# Patient Record
Sex: FEMALE | Race: WHITE | Employment: UNEMPLOYED | URBAN - METROPOLITAN AREA
[De-identification: names, ages, dates, MRNs, and addresses within clinical notes are randomized per-mention and may not be internally consistent; named-entity substitution may affect disease eponyms.]

---

## 2021-06-01 ENCOUNTER — APPOINTMENT (OUTPATIENT)
Dept: GENERAL RADIOLOGY | Facility: HOSPITAL | Age: 56
End: 2021-06-01
Attending: EMERGENCY MEDICINE
Payer: COMMERCIAL

## 2021-06-01 ENCOUNTER — HOSPITAL ENCOUNTER (EMERGENCY)
Facility: HOSPITAL | Age: 56
Discharge: HOME | End: 2021-06-02
Attending: EMERGENCY MEDICINE
Payer: COMMERCIAL

## 2021-06-01 DIAGNOSIS — R10.9 ABDOMINAL PAIN OF UNKNOWN ETIOLOGY: ICD-10-CM

## 2021-06-01 DIAGNOSIS — M54.2 NECK PAIN: ICD-10-CM

## 2021-06-01 DIAGNOSIS — R00.2 PALPITATIONS: ICD-10-CM

## 2021-06-01 DIAGNOSIS — R53.83 FATIGUE, UNSPECIFIED TYPE: Primary | ICD-10-CM

## 2021-06-01 PROCEDURE — 85025 COMPLETE CBC W/AUTO DIFF WBC: CPT | Performed by: EMERGENCY MEDICINE

## 2021-06-01 PROCEDURE — 99284 EMERGENCY DEPT VISIT MOD MDM: CPT

## 2021-06-01 PROCEDURE — 81001 URINALYSIS AUTO W/SCOPE: CPT | Performed by: EMERGENCY MEDICINE

## 2021-06-01 PROCEDURE — 93005 ELECTROCARDIOGRAM TRACING: CPT

## 2021-06-01 PROCEDURE — 80076 HEPATIC FUNCTION PANEL: CPT | Performed by: EMERGENCY MEDICINE

## 2021-06-01 PROCEDURE — 87880 STREP A ASSAY W/OPTIC: CPT

## 2021-06-01 PROCEDURE — 80048 BASIC METABOLIC PNL TOTAL CA: CPT | Performed by: EMERGENCY MEDICINE

## 2021-06-01 PROCEDURE — 71045 X-RAY EXAM CHEST 1 VIEW: CPT | Performed by: EMERGENCY MEDICINE

## 2021-06-01 PROCEDURE — 36415 COLL VENOUS BLD VENIPUNCTURE: CPT

## 2021-06-01 PROCEDURE — 84484 ASSAY OF TROPONIN QUANT: CPT | Performed by: EMERGENCY MEDICINE

## 2021-06-01 PROCEDURE — 83690 ASSAY OF LIPASE: CPT | Performed by: EMERGENCY MEDICINE

## 2021-06-01 PROCEDURE — 93010 ELECTROCARDIOGRAM REPORT: CPT | Performed by: EMERGENCY MEDICINE

## 2021-06-01 RX ORDER — METFORMIN HYDROCHLORIDE 500 MG/1
1000 TABLET, EXTENDED RELEASE ORAL
COMMUNITY
Start: 2020-03-17

## 2021-06-01 RX ORDER — LEVOTHYROXINE SODIUM 0.12 MG/1
125 TABLET ORAL DAILY
COMMUNITY
Start: 2021-02-05

## 2021-06-01 RX ORDER — LISINOPRIL 40 MG/1
TABLET ORAL
COMMUNITY
Start: 2021-01-28

## 2021-06-01 RX ORDER — ERGOCALCIFEROL (VITAMIN D2) 1250 MCG
CAPSULE ORAL
COMMUNITY
Start: 2021-01-29

## 2021-06-02 VITALS
WEIGHT: 206 LBS | BODY MASS INDEX: 30.51 KG/M2 | HEIGHT: 69 IN | DIASTOLIC BLOOD PRESSURE: 71 MMHG | HEART RATE: 74 BPM | SYSTOLIC BLOOD PRESSURE: 158 MMHG | TEMPERATURE: 98 F | RESPIRATION RATE: 17 BRPM | OXYGEN SATURATION: 99 %

## 2021-06-02 NOTE — ED PROVIDER NOTES
Patient Seen in: [facility] AND [facility] Emergency Department      History   Patient presents with:  Abdomen/Flank Pain    Stated Complaint: Abdominal pain    HPI/Subjective:   HPI  Patient is a 67-year-old female history of hypertension, hyperlipidemia, diabe Normocephalic. Mouth/Throat:      Mouth: Mucous membranes are moist.      Comments: Mild erythema in the oropharynx, no cervical lymphadenopathy, neck is supple with full range of motion  Eyes:      General: No scleral icterus.      Extraocular Movemen WITH PLATELET    Narrative: The following orders were created for panel order CBC With Differential With Platelet.   Procedure                               Abnormality         Status                     ---------                               --------- diagnosis)  Abdominal pain of unknown etiology  Palpitations  Neck pain     Disposition:  Discharge  6/2/2021  1:00 am    Follow-up:  Marito Damon, [address] [place] [id number] [phone]    Schedule an appointment as dante

## 2021-06-02 NOTE — ED INITIAL ASSESSMENT (HPI)
Patient coming in states she \"does not feel well\". Patient states that her skin and eyes are jaundice. Along with neck swelling. Patient also states she has tingly nerves, around her abdomin. Patient states symptoms started a few days ago.

## 2021-06-02 NOTE — ED QUICK NOTES
Care assumed. Alert and interactive Discussed pending diagnostics and continued plan.  Resting comfortably No complaints at present